# Patient Record
Sex: MALE | Race: WHITE | URBAN - METROPOLITAN AREA
[De-identification: names, ages, dates, MRNs, and addresses within clinical notes are randomized per-mention and may not be internally consistent; named-entity substitution may affect disease eponyms.]

---

## 2018-05-10 ENCOUNTER — OFFICE VISIT CONVERTED (OUTPATIENT)
Dept: OTHER | Facility: HOSPITAL | Age: 53
End: 2018-05-10
Attending: INTERNAL MEDICINE

## 2021-05-28 VITALS
SYSTOLIC BLOOD PRESSURE: 162 MMHG | BODY MASS INDEX: 24.73 KG/M2 | OXYGEN SATURATION: 94 % | HEIGHT: 69 IN | TEMPERATURE: 99 F | HEART RATE: 75 BPM | DIASTOLIC BLOOD PRESSURE: 71 MMHG | WEIGHT: 167 LBS

## 2021-05-28 NOTE — PROGRESS NOTES
Patient: KOBE LASSITER     Acct: [id number]     Report: #[id number]  UNIT #: [id number]     : 1965    Encounter Date:05/10/2018  PRIMARY CARE:   ***Signed***  --------------------------------------------------------------------------------------------------------------------  DATE: 5/10/18      Primary Care Provider:  JOSUE DAMICO      Referring Provider:  JOSUE DAMICO      Reason For Consult      New Patient abnormal blood count, probable high globulin level            History of Present Illness      52-year-old white male had been sent for an abnormal blood work which may be     related to abnormal proteins.  Abrasion was seen in my office in 2016 for     elevated PTT and macrocytosis.  Patient also gives a history of chronic     abdominal use, peripheral arterial disease, would not's phenomenon nicotine     dependence, hypertension, vitamin D deficiency, and hyperlipidemia.            Patient claims he continues to drink more than 5 bottles of beer per day day     and smoked a pack of cigarettes per day for over 20 years.  Patient had a     stroke in  .            Patient complains of burning on his right feet.            Past Medical/Surgical History             Hypertension             No Diabetes Mellitus             No Heart Disease             Blood Clots (CVA in )             No Cancer             Lung Disease (probable COPD)             No Kidney Disease             Other (positive lupus anticoagulant, vitamin D deficiency, hyperlipidemia)            Pyschiatric History      No Depression, No Anxiety, No Panic Attacks, No Bipolar, No Other            Social History      Social History:  Tobacco Use (1ppd 36 years), Alcohol Use (12 pack a day), No     Recreational Drug use, No Other            Family History      Hypertension (mother and father), No Diabetes Mellitus, No Heart Disease, No     Blood Clots, Cancer (father), No Lung Disease, No Kidney Disease, No Other             Allergies      Coded Allergies:             NO KNOWN ALLERGIES (Unverified , 5/10/18)            Medications      Folic Acid (Folic Acid*) 1 Mg Tablet      5 MG PO QDAY for 30 Days, #150 TAB 1 Refill         Prov: Lorna Stearns         5/10/18       Simvastatin (Simvastatin*) 10 Mg Tablet      10 MG PO HS, #30 TAB 0 Refills         Reported         1/21/15       NIFEdipine XL (Nifedical XL) 60 Mg Tab.sr.osm      60 MG PO QDAY, #30 TAB.SR 0 Refills         Reported         1/21/15      Current Medications      Current Medications Reviewed 5/10/18            Pain Assessment      Pain Intensity:  2 (right foot)            Review of Systems      Abnormal as noted below; all other systems have been reviewed and are negative.      General:  No Anxiety, Fatigue Scale: (2), Pain Scale: (2), No Fever, No Other      HEENT:  No Dysphagia, No Hearing Changes, No Rhinorrhea, No Tinnitus, No Visual     Changes, No Nasal Congestion, No Epistaxis, No Other      Respiratory:  No Cough, No Shortness of Air, No Sputum Changes, No Wheezing, No     Hemoptysis, No Congestion, No Other      Cardiovascular:  No Chest Pain, No Pedal Edema, No Orthopnea, No Palpitations,     No Chest Pressure, No Dizziness, No Other      Gastrointestinal:  No Nausea, No Vomiting, No Dysphagia, No Constipation, No     Diarrhea, No Appetite Fair, No Appetite Poor, No Early Satiety, No Other      Genitourinary:  No Nocturia, No Dysuria, No Other      Musculoskeletal:  No Joint Effusions, No Joint Tenderness, No Joint Stiffness,     No Myalgias, No Aches, No Pains, No Other      Endocrine:  No Heat Intolerance, No Cold Intolerance, No Fatigue, No Blood     Sugar Control, No Other      Hematologic:  No Bleeding, No Bruising, No Swollen Glands, No Other      Allergic/Immunologic:  No Hives, No Throat closing off, No Nasal drip, No Itchy     eyes, No Hay fever, No Other      Psychological:  No Anxiety, No Depression, No Other      Neurological:  No Headaches, No  Dizziness, No Weakness, No Numbness, No Other      Skin:  No Rash, No Open Wounds, No Edema, No Other      Vitals:             Height 5 ft 9 in / 175.26 cm           Weight 167 lbs 0 oz / 75.300667 kg           BSA 1.93 m2           BMI 24.7 kg/m2           Temperature 99.0 F / 37.22 C - Temporal           Pulse 75           Blood Pressure 162/71 Sitting, Left Arm           Pulse Oximetry 94%            Exam      Constitutional:  No acute distress, Conversant, Pleasant      Eyes:  Anicteric sclerae, Palpebral Conjunctivae (pink)      HENT:  Oropharynx clear, No Erythema, No Tonsils, Buccal mucosae (pink)      Neck:  Supple, Full Range of Motion      Lungs:  Clear to Ausculation, Normal Respiratory Effort      Cardiovascular:  RRR, No Murmurs, Normal PMI, No Peripheral Edema      Abdomen:  Soft, NABS, No Masses, No Tenderness      Skin:  Normal temperature, Other (no dermatosis)      Extremities:  No digital cyanosis, Normal gait, Other (no deformity patient is     on a wheelchair but can walk and get on examining table)      Psychiatric:  Appropriate affect, Intact judgement, AAO x 3      Neurological:  Cranial Nerve II-XII (Intact), No Focal Sensory deficits      Lymphatic:  No Neck            Lab Results      Old records showed a white count of 12.8 hemoglobin 16.8, hematocrit 50.3,     macrocytosis, platelet count 282,000, iron profile normal      Folate 8.1 low, vitamin B12 397 TSH 2.81,  lipid profile good            Impression/Problem List      Macrocytosis      Low folate level      Chronic EtOH intake      Chronic nicotine dependence      History of cerebrovascular accident      History of positive  lupus anticoagulant      Possible peripheral arterial disease      Peripheral neuropathy            Plan      The following tests are performed repeat lupus anticoagulant, methylmalonic acid    , homocysteine, one level.      Serum protein electrophoresis with immunofixation      Anemia workup.      Patient  started folic acid 5 mg per day.      Thank you very much for allowing me to participate in the care of your patient.      I will keep you posted on the progress of his workup.            PREVENTION      Hx Influenza Vaccination:  No      Influenza Vaccine Declined:  No      2 or More Falls Past Year?:  No      Fall Past Year with Injury?:  No      Hx Pneumococcal Vaccination:  Yes (UTD)      Encouraged to follow-up with:  PCP regarding preventative exams.      Chart initiated by      Alicia Pack MA                 Disclaimer: Converted document may not contain table formatting or lab diagrams. Please see [employer] for the authenticated document.